# Patient Record
Sex: MALE | Race: WHITE | URBAN - METROPOLITAN AREA
[De-identification: names, ages, dates, MRNs, and addresses within clinical notes are randomized per-mention and may not be internally consistent; named-entity substitution may affect disease eponyms.]

---

## 2024-08-06 ENCOUNTER — TELEPHONE (OUTPATIENT)
Dept: BARIATRICS/WEIGHT MGMT | Age: 24
End: 2024-08-06

## 2024-10-01 ENCOUNTER — TELEPHONE (OUTPATIENT)
Dept: BARIATRICS/WEIGHT MGMT | Age: 24
End: 2024-10-01

## 2024-10-02 ENCOUNTER — OFFICE VISIT (OUTPATIENT)
Dept: BARIATRICS/WEIGHT MGMT | Age: 24
End: 2024-10-02

## 2024-10-02 VITALS
HEIGHT: 67 IN | SYSTOLIC BLOOD PRESSURE: 118 MMHG | DIASTOLIC BLOOD PRESSURE: 80 MMHG | BODY MASS INDEX: 41.75 KG/M2 | OXYGEN SATURATION: 98 % | HEART RATE: 82 BPM | WEIGHT: 266 LBS | RESPIRATION RATE: 18 BRPM

## 2024-10-02 DIAGNOSIS — E66.01 MORBID OBESITY WITH BMI OF 40.0-44.9, ADULT: Primary | ICD-10-CM

## 2024-10-02 RX ORDER — BUPROPION HCL 150 MG
150 TABLET, EXTENDED RELEASE 24 HR ORAL EVERY MORNING
COMMUNITY
Start: 2024-05-17

## 2024-10-02 RX ORDER — TRAZODONE HYDROCHLORIDE 150 MG/1
150 TABLET ORAL NIGHTLY
COMMUNITY
Start: 2024-09-16

## 2024-10-02 NOTE — PROGRESS NOTES
Kannan Quintanilla is a 24 y.o. male with a date of birth of 2000.    Vitals:    10/02/24 1158   BP: 118/80   Pulse: 82   Resp: 18   SpO2: 98%   Weight: 120.7 kg (266 lb)   Height: 1.702 m (5' 7\")    BMI: Body mass index is 41.66 kg/m². Obesity Classification: Class III    Weight History:   Wt Readings from Last 3 Encounters:   10/02/24 120.7 kg (266 lb)       Pt attended Medical Weight Management Seminar. Patient was educated on low-carb diet protocol. Nutrition and habit guidelines were discussed and written information was provided. Bariatric Nutrition Questionnaire completed during class and scanned into media.       Goals  Weight: 180 lbs  Health Improvement: less pain    Assessment  Nutritional Needs: RMR=(9.99 x 120.7) + (6.25 x 170.2) - (4.92 x 24 y.o.) +5 = 2151 kcal x 1.3 (sedentary activity factor)= 2797 kcal - 1000 (for 2 lb weight loss/week)= 1797 kcal.    Patient has participated in the following weight loss programs: none.   Patient has not participated in meal replacement/liquid diets.  Patient has not participated in weight loss medications.  Patient does not have history of bariatric surgery.     Hx eating out of stress, emotions, and boredom.     Plan  Plan/Recommendations: 1800 kcal LCMP  Optifast:  no  Diet Medications:  no  Bariatric Surgery:  no  1:1 RD Visit:  no    PES Statement: Overweight/Obesity related to lack of exercise, sedentary lifestyle, unhealthy eating habits, and unsuccessful diet attempts as evidenced by BMI. Body mass index is 41.66 kg/m².    Handouts: protein shake, frozen meals, cravings    Will follow up as necessary.    TOM LOPEZ, MS, RD, LD

## 2024-10-03 ENCOUNTER — TELEMEDICINE (OUTPATIENT)
Dept: BARIATRICS/WEIGHT MGMT | Age: 24
End: 2024-10-03

## 2024-10-03 DIAGNOSIS — Z71.3 DIETARY COUNSELING AND SURVEILLANCE: ICD-10-CM

## 2024-10-03 DIAGNOSIS — E66.01 MORBID OBESITY WITH BMI OF 40.0-44.9, ADULT: Primary | ICD-10-CM

## 2024-10-03 ASSESSMENT — ENCOUNTER SYMPTOMS
EYE PAIN: 0
CHOKING: 0
PHOTOPHOBIA: 0
DIARRHEA: 0
COUGH: 0
WHEEZING: 0
NAUSEA: 0
ABDOMINAL PAIN: 0
CHEST TIGHTNESS: 0
SHORTNESS OF BREATH: 0
VOMITING: 0
BLOOD IN STOOL: 0
CONSTIPATION: 0
ABDOMINAL DISTENTION: 0
APNEA: 0

## 2024-10-03 NOTE — PROGRESS NOTES
Patient: Kannan Quintanilla     Encounter Date: 10/3/2024    YOB: 2000               Age: 24 y.o.        Patient identification was verified at the start of the visit.         10/3/2024     3:17 PM   Patient-Reported Vitals   Patient-Reported Weight 265   Patient-Reported Height 5'6\"         BP Readings from Last 1 Encounters:   10/02/24 118/80       BMI Readings from Last 1 Encounters:   10/02/24 41.66 kg/m²       Pulse Readings from Last 1 Encounters:   10/02/24 82                                             Wt Readings from Last 3 Encounters:   10/02/24 120.7 kg (266 lb)        Chief Complaint   Patient presents with    Bariatric, Initial Visit     [facility]- NP        HPI:    24 y.o. male presents to establish care via video visit. The patient's medical history is significant for class III obesity. The patient has a long-standing history of obesity which started gradually. The problem is severe.  The patient has been gaining weight.  Risk factors include annual weight gain of >2 lbs (1 kg)/ year and sedentary lifestyle. Aggravating factors include poor diet and lack of physical activity. The patient has tried various diet/exercise plans which have been ineffective in the long-run. he is motivated to start losing weight to help improve his overall health.     When did you become overweight?  [] Childhood   [x] Teens   [] Adulthood   [] Pregnancy   [] Menopause    Highest adult weight: 270 pounds at current age     Triggers for weight gain?   [] Stress   [] Illness   [] Medications   [] Travel  []Injury     [] Nightshift work   [] Insomnia  [x] No specific triggers   [] Other    Food triggers:   [x] Stress   [x] Boredom   [x] Fast food   [x] Eating out   [] Seeking reward   [] Social     Have you ever taken prescription medications to help you lose weight?   [] Yes  [x] No    Have you ever been on a meal replacement program?  [] Yes  [x] No        The patient denies any significant cardiac disease.   The patient

## 2024-11-01 ENCOUNTER — TELEPHONE (OUTPATIENT)
Dept: BARIATRICS/WEIGHT MGMT | Age: 24
End: 2024-11-01

## 2024-11-01 DIAGNOSIS — E66.01 MORBID OBESITY WITH BMI OF 40.0-44.9, ADULT: Primary | ICD-10-CM

## 2024-11-01 NOTE — TELEPHONE ENCOUNTER
RD called pt and attempted to do RD consult over the phone.  Pt reports that he is unavailable at this time.  Told pt that RD would reach back out to him to complete consult over the phone on Tuesday afternoon.  Pt told not to come to office next Friday 11/8/24 for nutrition consult.   Pt verbalized understanding.

## 2024-11-06 ENCOUNTER — TELEPHONE (OUTPATIENT)
Dept: BARIATRICS/WEIGHT MGMT | Age: 24
End: 2024-11-06

## 2024-11-06 NOTE — TELEPHONE ENCOUNTER
Kannan Quintanilla lost 10 lbs over 1 month per self reproted weight of 256#. Pt followed MP for few weeks but felt it was not very effective because his weight went up and down. Discussed that weight loss isn't linear and that can happen, also discussed that fluid status and eating high sodium meals can also effect weight     Current treatment plan: 1800 kcal LC MP    Is patient adhering to diet/meal plan: was for a few weeks but didn't feel it was effective   Tracking: writing down foods and weight weekly     Breakfast: eggs and turkey sausage and ricotta/toast  Snack: quest bars   Lunch: fish filet OR hummus and crackers   Snack: none OR quest bar  Dinner: fish filet with broccoli  Snack: none OR fruit    Consuming at least 64oz of calorie free fluids?  Has a water tumbler that he fills 1 per day ; 3 cans of Mt dew/Coke/Root Beer; eliminated fruit juice     Participating in intentional exercise? yes walks dog daily    Plan/Goals:   - get back to follow 1800 a[address]re MP  - eliminate or significantly reduce pop consumption     Handouts: none    Marie Coffey, PAULIE, LD